# Patient Record
Sex: FEMALE | Race: WHITE
[De-identification: names, ages, dates, MRNs, and addresses within clinical notes are randomized per-mention and may not be internally consistent; named-entity substitution may affect disease eponyms.]

---

## 2017-08-30 ENCOUNTER — HOSPITAL ENCOUNTER (INPATIENT)
Dept: HOSPITAL 45 - C.EDB | Age: 57
LOS: 1 days | Discharge: HOME | DRG: 694 | End: 2017-08-31
Attending: INTERNAL MEDICINE | Admitting: HOSPITALIST
Payer: COMMERCIAL

## 2017-08-30 VITALS
WEIGHT: 216.49 LBS | BODY MASS INDEX: 40.87 KG/M2 | HEIGHT: 61 IN | BODY MASS INDEX: 40.87 KG/M2 | WEIGHT: 216.49 LBS | HEIGHT: 61 IN

## 2017-08-30 VITALS
DIASTOLIC BLOOD PRESSURE: 81 MMHG | OXYGEN SATURATION: 91 % | HEART RATE: 81 BPM | SYSTOLIC BLOOD PRESSURE: 158 MMHG | TEMPERATURE: 98.06 F

## 2017-08-30 DIAGNOSIS — E66.9: ICD-10-CM

## 2017-08-30 DIAGNOSIS — F32.9: ICD-10-CM

## 2017-08-30 DIAGNOSIS — Z79.899: ICD-10-CM

## 2017-08-30 DIAGNOSIS — Z98.84: ICD-10-CM

## 2017-08-30 DIAGNOSIS — Z90.5: ICD-10-CM

## 2017-08-30 DIAGNOSIS — Z83.3: ICD-10-CM

## 2017-08-30 DIAGNOSIS — N13.2: Primary | ICD-10-CM

## 2017-08-30 DIAGNOSIS — Z87.442: ICD-10-CM

## 2017-08-30 DIAGNOSIS — I10: ICD-10-CM

## 2017-08-30 DIAGNOSIS — E53.8: ICD-10-CM

## 2017-08-30 DIAGNOSIS — Z85.528: ICD-10-CM

## 2017-08-30 DIAGNOSIS — Z82.49: ICD-10-CM

## 2017-08-30 LAB
ANION GAP SERPL CALC-SCNC: 6 MMOL/L (ref 3–11)
APPEARANCE UR: (no result)
BILIRUB UR-MCNC: (no result) MG/DL
BUN SERPL-MCNC: 21 MG/DL (ref 7–18)
BUN/CREAT SERPL: 19 (ref 10–20)
CALCIUM SERPL-MCNC: 9.7 MG/DL (ref 8.5–10.1)
CHLORIDE SERPL-SCNC: 108 MMOL/L (ref 98–107)
CO2 SERPL-SCNC: 28 MMOL/L (ref 21–32)
COLOR UR: YELLOW
CREAT CL PREDICTED SERPL C-G-VRATE: 63.4 ML/MIN
CREAT SERPL-MCNC: 1.1 MG/DL (ref 0.6–1.2)
EOSINOPHIL NFR BLD AUTO: 334 K/UL (ref 130–400)
GLUCOSE SERPL-MCNC: 117 MG/DL (ref 70–99)
HCT VFR BLD CALC: 37.6 % (ref 37–47)
MANUAL MICROSCOPIC REQUIRED?: NO
MCH RBC QN AUTO: 26.5 PG (ref 25–34)
MCHC RBC AUTO-ENTMCNC: 31.1 G/DL (ref 32–36)
MCV RBC AUTO: 85.1 FL (ref 80–100)
NITRITE UR QL STRIP: (no result)
PH UR STRIP: 5 [PH] (ref 4.5–7.5)
PMV BLD AUTO: 9.2 FL (ref 7.4–10.4)
POTASSIUM SERPL-SCNC: 4.1 MMOL/L (ref 3.5–5.1)
RBC # BLD AUTO: 4.42 M/UL (ref 4.2–5.4)
REVIEW REQ?: NO
SODIUM SERPL-SCNC: 142 MMOL/L (ref 136–145)
SP GR UR STRIP: 1.02 (ref 1–1.03)
URINE BILL WITH OR WITHOUT MIC: (no result)
URINE EPITHELIAL CELL AUTO: >30 /LPF (ref 0–5)
UROBILINOGEN UR-MCNC: (no result) MG/DL
WBC # BLD AUTO: 11.52 K/UL (ref 4.8–10.8)
ZZUR CULT IF INDIC CLEAN CATCH: NO

## 2017-08-30 RX ADMIN — MORPHINE SULFATE PRN MG: 4 INJECTION, SOLUTION INTRAMUSCULAR; INTRAVENOUS at 12:44

## 2017-08-30 RX ADMIN — HYDROMORPHONE HYDROCHLORIDE PRN MG: 1 INJECTION, SOLUTION INTRAMUSCULAR; INTRAVENOUS; SUBCUTANEOUS at 16:44

## 2017-08-30 RX ADMIN — METOPROLOL TARTRATE SCH MG: 25 TABLET, FILM COATED ORAL at 21:08

## 2017-08-30 RX ADMIN — MORPHINE SULFATE PRN MG: 4 INJECTION, SOLUTION INTRAMUSCULAR; INTRAVENOUS at 13:26

## 2017-08-30 RX ADMIN — SODIUM CHLORIDE AND POTASSIUM CHLORIDE SCH MLS/HR: 9; 1.49 INJECTION, SOLUTION INTRAVENOUS at 16:01

## 2017-08-30 RX ADMIN — ACETAMINOPHEN PRN MG: 325 TABLET ORAL at 21:08

## 2017-08-30 RX ADMIN — HYDROMORPHONE HYDROCHLORIDE PRN MG: 1 INJECTION, SOLUTION INTRAMUSCULAR; INTRAVENOUS; SUBCUTANEOUS at 19:26

## 2017-08-30 RX ADMIN — FAMOTIDINE SCH MLS/HR: 10 INJECTION INTRAVENOUS at 17:41

## 2017-08-30 RX ADMIN — HYDROMORPHONE HYDROCHLORIDE PRN MG: 1 INJECTION, SOLUTION INTRAMUSCULAR; INTRAVENOUS; SUBCUTANEOUS at 22:15

## 2017-08-30 NOTE — EMERGENCY ROOM VISIT NOTE
History


Report prepared by Caty:  Matt Baig


Under the Supervision of:  Dr. Raymond Grissom M.D.


First contact with patient:  12:27


Chief Complaint:  FLANK PAIN


Stated Complaint:  RIGHT FLANK PAIN





History of Present Illness


The patient is a 57 year old female with a history of kidney stones and kidney 

cancer with a left kidney removal who presents to the Emergency Room with 

complaints of persistent right flank pain that started around 4 hours ago. She 

says that the pain has been a 10 out of 10 in severity, and has never been this 

bad before. The patient states that she has been nauseated as well. She says 

that she follows with Dr. Alicia of urology, and sees him every 6 months. 

The patient notes that she is due to see Dr. Alicia soon, and she usually 

has to have a stent put in when she see is passing a stone. The patient denies 

any vomiting, fevers, chills, or urinary symptoms.





   Source of History:  patient


   Onset:  4 hours ago


   Position:  other (right flank)


   Symptom Intensity:  10/10 pain - worst pain ever


   Timing:  other (persistent)


   Associated Symptoms:  + nausea, No fevers, No chills, No vomiting, No 

urinary symptoms





Review of Systems


See HPI for pertinent positives & negatives. A total of 10 systems reviewed and 

were otherwise negative.





Past Medical & Surgical


Medical Problems:


(1) Cancer of kidney


(2) History of nephrectomy, unilateral


(3) Kidney stone


(4) Migraine aura, persistent, intractable


(5) Right ureteral calculus


(6) Sepsis due to urinary tract infection


(7) Ureteral calculi


(8) UTI (urinary tract infection)








Family History





Cancer


Diabetes mellitus


FH: heart disease


FHx: gallbladder disease


Hypertension





Social History


Smoking Status:  Never Smoker


Drug Use:  none


Marital Status:  


Housing Status:  lives with significant other


Occupation Status:  employed





Current/Historical Medications


Scheduled


Lisinopril (Lisinopril), 10 MG PO QAM


Metoprolol Tartrate (Lopressor), 12.5 MG PO QAM


Venlafaxine Hcl (Effexor Xr), 75 MG PO QAM


[Vitamin B-12 Inj], INJ MONTHLY





Scheduled PRN


Ibuprofen (Motrin), 600 MG PO DAILY PRN for Pain or Fever





Allergies


Coded Allergies:  


     No Known Allergies (Verified , 8/30/17)





Physical Exam


Vital Signs











  Date Time  Temp Pulse Resp B/P (MAP) Pulse Ox O2 Delivery O2 Flow Rate FiO2


 


8/30/17 13:29  71 20 176/100 95 Room Air  


 


8/30/17 12:09 36.7 68 20 185/101 97 Room Air  











Physical Exam


GENERAL: Patient is in no acute distress.


HEENT: No acute trauma, normocephalic atraumatic, mucous membranes moist, no 

nasal congestion, no scleral icterus.


NECK: No stridor, no adenopathy, no meningismus, trachea is midline.


LUNGS: Clear to auscultation bilaterally, no wheeze, no rhonchi, breath sounds 

equal.


HEART: Without murmurs gallops or rubs, regular rate and rhythm.


ABDOMEN: Tender along entire right side. Soft, bowel sounds positive, no hernias

, no peritonitis.


BACK: Right flank discomfort with percussion.


EXTREMITIES: No cyanosis or edema, full range of motion of all the joints 

without pain or difficulty, no signs for acute trauma.


NEUROLOGIC: Oriented x 3, no acute motor or sensory deficits, no focal weakness.


SKIN: No rash, no jaundice, no diaphoresis.





Medical Decision & Procedures


ER Provider


Diagnostic Interpretation:


CT results as stated below per my review and radiologist interpretation: 








CT OF THE ABDOMEN AND PELVIS WITHOUT CONTRAST, STONE PROTOCOL





CLINICAL HISTORY: Right flank pain and hematuria.    





COMPARISON STUDY:  KUB June 28, 2016 and CT of the abdomen and pelvis September 30, 2010.





TECHNIQUE: Helical axial images of the abdomen and pelvis were obtained without


IV or oral contrast according to renal stone protocol.  A dose lowering


technique was utilized adhering to the principles of ALARA.





FINDINGS: The left kidney is not visualized and likely is surgically absent.


There is moderate right hydroureteronephrosis. Note is made of a 3 mm mid right


ureteral calculus shown on image 330 of 516. There are 2 additional distal right


ureteral calculi that each measure 6 mm. Multiple right renal calculi measure up


to 6 mm. There is mild right perinephric and periureteral ureteral infiltration.


Evaluation the remainder of the abdomen and pelvis is suboptimal on this


unenhanced exam. No abnormalities are noted within the nephrectomy bed. There is


fatty infiltration of the liver. Mild biliary ductal dilatation is unchanged and


likely related to prior cholecystectomy. The spleen is unremarkable. There is no


bowel obstruction. This colonic diverticulosis without evidence for acute


diverticulitis.





IMPRESSION:  





1. Three right ureteral calculi, including two 6 mm distal ureteral calculi and


a 3 mm mid ureteral calculus with resultant moderate right


hydroureteronephrosis.





2. Right-sided nephrolithiasis.





3. Surgically absent left kidney. No abnormality within the nephrectomy bed.





4. Status post gastric bypass. No bowel obstruction.





Electronically signed by:  Doni Ibrahim M.D.


8/30/2017 1:15 PM





Dictated Date/Time:  8/30/2017 1:05 PM





Laboratory Results


8/30/17 12:25








8/30/17 12:25

















Test


  8/30/17


12:25 8/30/17


12:27


 


Red Blood Count


  4.42 M/uL


(4.2-5.4) 


 


 


Mean Corpuscular Volume


  85.1 fL


() 


 


 


Mean Corpuscular Hemoglobin


  26.5 pg


(25-34) 


 


 


Mean Corpuscular Hemoglobin


Concent 31.1 g/dl


(32-36) 


 


 


RDW Standard Deviation


  43.4 fL


(36.4-46.3) 


 


 


RDW Coefficient of Variation


  13.9 %


(11.5-14.5) 


 


 


Mean Platelet Volume


  9.2 fL


(7.4-10.4) 


 


 


Anion Gap


  6.0 mmol/L


(3-11) 


 


 


Est Creatinine Clear Calc


Drug Dose 63.4 ml/min 


  


 


 


Estimated GFR (


American) 65.5 


  


 


 


Estimated GFR (Non-


American 56.5 


  


 


 


BUN/Creatinine Ratio 19.0 (10-20)  


 


Calcium Level


  9.7 mg/dl


(8.5-10.1) 


 


 


Urine Color  YELLOW 


 


Urine Appearance  CLOUDY (CLEAR) 


 


Urine pH  5.0 (4.5-7.5) 


 


Urine Specific Gravity


  


  1.016


(1.000-1.030)


 


Urine Protein  1+ (NEG) 


 


Urine Glucose (UA)  NEG (NEG) 


 


Urine Ketones  NEG (NEG) 


 


Urine Occult Blood  3+ (NEG) 


 


Urine Nitrite  NEG (NEG) 


 


Urine Bilirubin  NEG (NEG) 


 


Urine Urobilinogen  NEG (NEG) 


 


Urine Leukocyte Esterase  TRACE (NEG) 


 


Urine WBC (Auto)  1-5 /hpf (0-5) 


 


Urine RBC (Auto)  >30 /hpf (0-4) 


 


Urine Hyaline Casts (Auto)  1-5 /lpf (0-5) 


 


Urine Epithelial Cells (Auto)  >30 /lpf (0-5) 


 


Urine Bacteria (Auto)  NEG (NEG) 





Laboratory results reviewed by me.





Medications Administered











 Medications


  (Trade)  Dose


 Ordered  Sig/Lizzette


 Route  Start Time


 Stop Time Status Last Admin


Dose Admin


 


 Sodium Chloride  500 ml @ 


 999 mls/hr  Q31M STAT


 IV  8/30/17 12:32


 8/30/17 13:02 DC 8/30/17 12:32


999 MLS/HR


 


 Ondansetron HCl


  (Zofran Inj)  4 mg  NOW  STAT


 IV  8/30/17 12:32


 8/30/17 12:35 DC 8/30/17 12:42


4 MG


 


 Sodium Chloride  1,000 ml @ 


 200 mls/hr  Q5H STAT


 IV  8/30/17 12:32


 8/30/17 17:31  8/30/17 13:28


200 MLS/HR


 


 Morphine Sulfate


  (MoRPHine


 SULFATE INJ)  4 mg  Q15M  PRN


 IV  8/30/17 12:45


 9/13/17 12:44  8/30/17 13:26


4 MG


 


 Ketorolac


 Tromethamine


  (Toradol Inj)  15 mg  NOW  STAT


 IV  8/30/17 12:32


 8/30/17 12:35 DC 8/30/17 12:43


15 MG











ED Course


1230: The patient was evaluated in room C9. A complete history and physical 

exam was performed.





1232: Ordered Toradol Inj 15 mg IV, NSS 1000 ml @ 200 mls/hr IV, Zofran Inj 4 

mg IV,  ml @ 999 mls/hr IV.





1245: Ordered Morphine Sulfate Inj 4 mg IV PRN.





1328: I discussed the patient with Lula Lemus PA-C for Dr. Alicia (

[facility] urology) - she will talk to the attending (Dr. Phillips) about stenting the 

patient today or tomorrow. Regardless, the patient will be brought into the 

hospital, and she told me to call the hospitalist.





1332: Upon reexamination the patient is resting. I discussed results and 

treatment plan with the patient. She verbalizes agreement and understanding. 

The patient will be evaluated for further management.





1345: I discussed the patient with Dr. Strickland - [facility] hospitalist - he 

will evaluate the patient for further treatment.





Medical Decision


Differential diagnosis includes but is not limited to renal colic, UTI, renal 

failure, hydronephrosis, musculoskeletal pain, dehydration.





There is a mild leukocytosis, this could be consistent with infection or just 

her pain.  No anemia.  No significant electrolyte abnormality or kidney 

failure.  Urinalysis does not show infection but hematuria was noted.  

Abdominal and pelvis CT shows 3 fairly large stones in the right ureter causing 

hydronephrosis.





The patient received IV saline, IV Toradol, IV Zofran and IV morphine, she 

still has pain but the pain is improved.





I spoke to urology.  Admission/observation was recommended--she only has one 

kidney.  That kidney right now is obstructed.  She will likely require a 

ureteral stent.  I spoke to case management and the patient.  The on-call 

hospitalist was consulted.





Medication Reconcilliation


Current Medication List:  was personally reviewed by me





Blood Pressure Screening


Patient's blood pressure:  Elevated blood pressure


Blood pressure disposition:  Elevated BP felt to be situational





Consults


Time Called:  1325


Consulting Physician:  Lula Lemus PA-C for Dr. Alicia ([facility] urology)


Returned Call:  1328


I discussed the patient with Lula Lemus PA-C for Dr. Alicia ([facility] 

urology) - she will talk to the attending (Dr. Phillips) about stenting the 

patient today or tomorrow. Regardless, the patient will be brought into the 

hospital, and she told me to call the hospitalist.


Additional Consults:  


   Time Called:  1340


   Cons[facility]ed Physician[facility]  [facility]


   Returned Call:  1345 (in person)


Additional Comments:


I discussed the patient with Dr. Marco A TREVIÑO hospitalist - he will 

evaluate the patient for further treatment.





Impression





 Primary Impression:  


 Renal colic


 Additional Impression:  


 Hydronephrosis





Scribe Attestation


The scribe's documentation has been prepared under my direction and personally 

reviewed by me in its entirety. I confirm that the note above accurately 

reflects all work, treatment, procedures, and medical decision making performed 

by me.





Departure Information


Dispostion


Being Evaluated By Hospitalist





Referrals


No Doctor, Assigned (PCP)





Patient Instructions


My [facility]





Problem Qualifiers

## 2017-08-30 NOTE — HISTORY AND PHYSICAL
History & Physical


Date & Time of Service:


Aug 30, 2017 at 14:42


Chief Complaint:


Right Flank Pain


Primary Care Physician:


Amadou Garcia M.D.


History of Present Illness


Source:  patient, spouse, hospital records


The patient is a 57-year-old female who is status post left nephrectomy due to 

renal cell cancer, who presents emergency department with persistent right 

flank pain that began about 4 hours prior to arrival.  She has a known history 

of kidney stones, and reports the pain is similar to previous.  She's also had 

some nausea without vomiting.  She follows with , whom she sees 

approximately every 6 months.  She has had a history of ureteral stents in the 

past.





Past Medical/Surgical History


Medical Problems:


(1) Cancer of kidney


Status: Chronic  





(2) History of nephrectomy, unilateral


Status: Resolved  





(3) Kidney stone


Status: Chronic  











Family History





Cancer


Diabetes mellitus


FH: heart disease


FHx: gallbladder disease


Hypertension





Social History


Smoking Status:  Never Smoker


Smokeless Tobacco Use:  No


Alcohol Use:  none


Drug Use:  none


Marital Status:  


Housing status:  lives with family


Occupational Status:  employed





Immunizations


History of Influenza Vaccine:  No


History of Tetanus Vaccine?:  Yes


History of Pneumococcal:  No


History of Hepatitis B Vaccine:  Yes





Multi-Drug Resistant Organisms


History of MDRO:  No





Allergies


Coded Allergies:  


     No Known Allergies (Verified , 8/30/17)





Home Medications


Scheduled


Lisinopril (Lisinopril), 10 MG PO QAM


Metoprolol Tartrate (Lopressor), 12.5 MG PO QAM


Venlafaxine Hcl (Effexor Xr), 75 MG PO QAM


[Vitamin B-12 Inj], INJ MONTHLY





Scheduled PRN


Ibuprofen (Motrin), 600 MG PO DAILY PRN for Pain or Fever





Review of Systems


The patient denies chest pain, palpitations, shortness of breath, cough, lower 

extremity swelling, vision change, hearing change, sore throat, fevers, chills, 

sweats, weight change, fatigue, vomiting, diarrhea or constipation, blood in 

urine or stool, dysuria, urinary frequency or urgency, lightheadedness, 

dizziness, headache, memory loss, rash, abnormal bruising or bleeding, imbalance

, focal or generalized weakness, numbness or tingling in arms or legs, 

generalized arthralgias or myalgias, neck pain, night sweats, or allergy 

symptoms.


The review of systems is otherwise negative other than for that already noted 

above, and at least 10 systems have been reviewed.





Physical Exam


Vital Signs











  Date Time  Temp Pulse Resp B/P (MAP) Pulse Ox O2 Delivery O2 Flow Rate FiO2


 


8/30/17 13:29  71 20 176/100 95 Room Air  


 


8/30/17 12:09 36.7 68 20 185/101 97 Room Air  








The patient is awake, well-developed and adequately nourished, alert and 

oriented 3, normocephalic and atraumatic, lying in bed and in no acute 

distress.


HEENT--PERRL, EOMI, mucous membranes  and oropharynx dry.


Neck--supple, no JVD or bruits, thyroid normal, trachea midline, no adenopathy.


Heart--normal S1 and S2, no extra beats, no murmurs, rubs or gallops.


Lungs--clear bilaterally with good air movement, no respiratory distress, no 

accessory muscle use.


Abdomen--normal bowel sounds and soft, nontender and nondistended, no hernias 

or masses,  no organomegaly.  Right flank pain upon palpation.


Extremities--no cyanosis, clubbing or edema. There are good distal pulses b/l.


Dermatologic--normal skin turgor, normal color, warm and dry, no abnormal lymph 

nodes, no rash.


Neurologic--cranial nerves II through XII grossly intact, motor and sensory 

examination normal.


Rheumatologic--normal range of motion, nontender, muscles and joints.


Psychiatric--normal affect.





Diagnostics


Laboratory Results





Results Past 24 Hours








Test


  8/30/17


12:25 8/30/17


12:27 Range/Units


 


 


White Blood Count 11.52  4.8-10.8  K/uL


 


Red Blood Count 4.42  4.2-5.4  M/uL


 


Hemoglobin 11.7  12.0-16.0  g/dL


 


Hematocrit 37.6  37-47  %


 


Mean Corpuscular Volume 85.1    fL


 


Mean Corpuscular Hemoglobin 26.5  25-34  pg


 


Mean Corpuscular Hemoglobin


Concent 31.1


  


  32-36  g/dl


 


 


RDW Standard Deviation 43.4  36.4-46.3  fL


 


RDW Coefficient of Variation 13.9  11.5-14.5  %


 


Platelet Count 334  130-400  K/uL


 


Mean Platelet Volume 9.2  7.4-10.4  fL


 


Sodium Level 142  136-145  mmol/L


 


Potassium Level 4.1  3.5-5.1  mmol/L


 


Chloride Level 108    mmol/L


 


Carbon Dioxide Level 28  21-32  mmol/L


 


Anion Gap 6.0  3-11  mmol/L


 


Blood Urea Nitrogen 21  7-18  mg/dl


 


Creatinine


  1.10


  


  0.60-1.20


mg/dl


 


Est Creatinine Clear Calc


Drug Dose 63.4


  


   ml/min


 


 


Estimated GFR (


American) 65.5


  


   


 


 


Estimated GFR (Non-


American 56.5


  


   


 


 


BUN/Creatinine Ratio 19.0  10-20  


 


Random Glucose 117  70-99  mg/dl


 


Calcium Level 9.7  8.5-10.1  mg/dl


 


Urine Color  YELLOW  


 


Urine Appearance  CLOUDY CLEAR  


 


Urine pH  5.0 4.5-7.5  


 


Urine Specific Gravity  1.016 1.000-1.030  


 


Urine Protein  1+ NEG  


 


Urine Glucose (UA)  NEG NEG  


 


Urine Ketones  NEG NEG  


 


Urine Occult Blood  3+ NEG  


 


Urine Nitrite  NEG NEG  


 


Urine Bilirubin  NEG NEG  


 


Urine Urobilinogen  NEG NEG  


 


Urine Leukocyte Esterase  TRACE NEG  


 


Urine WBC (Auto)  1-5 0-5  /hpf


 


Urine RBC (Auto)  >30 0-4  /hpf


 


Urine Hyaline Casts (Auto)  1-5 0-5  /lpf


 


Urine Epithelial Cells (Auto)  >30 0-5  /lpf


 


Urine Bacteria (Auto)  NEG NEG  








Microbiology Results


8/30/17 Urine Culture, Received


          Pending





Diagnostic Radiology





                                                                               

                                                                 


Patient Name: SEGUNDO LORD                               Unit Number:  

[id number]                  


 








 











Dictated: 08/30/17 1305


 


Transcribed: 08/30/17 1305


 





 


Printed Date/Time: [~ rep prt dt]/[~ rep prt tm]








 [~ rep ct labl] - [~ rep ct ivnm]


 





   [facility]


 Radiology Department


 [place] [address] [phone]





 











Dictated: 08/30/17 1305


 


Transcribed: 08/30/17 1305


 





 


Printed Date/Time: [~ rep prt dt]/[~ rep prt tm]








 [~ rep ct labl] - [~ rep ct ivnm]


 








[~ rep ct add3]]


CT OF THE ABDOMEN AND PELVIS WITHOUT CONTRAST, STONE PROTOCOL





CLINICAL HISTORY: Right flank pain and hematuria.    





COMPARISON STUDY:  KUB June 28, 2016 and CT of the abdomen and pelvis September 30, 2010.





TECHNIQUE: Helical axial images of the abdomen and pelvis were obtained without


IV or oral contrast according to renal stone protocol.  A dose lowering


technique was utilized adhering to the principles of ALARA.





FINDINGS: The left kidney is not visualized and likely is surgically absent.


There is moderate right hydroureteronephrosis. Note is made of a 3 mm mid right


ureteral calculus shown on image 330 of 516. There are 2 additional distal right


ureteral calculi that each measure 6 mm. Multiple right renal calculi measure up


to 6 mm. There is mild right perinephric and periureteral ureteral infiltration.


Evaluation the remainder of the abdomen and pelvis is suboptimal on this


unenhanced exam. No abnormalities are noted within the nephrectomy bed. There is


fatty infiltration of the liver. Mild biliary ductal dilatation is unchanged and


likely related to prior cholecystectomy. The spleen is unremarkable. There is no


bowel obstruction. This colonic diverticulosis without evidence for acute


diverticulitis.





IMPRESSION:  





1. Three right ureteral calculi, including two 6 mm distal ureteral calculi and


a 3 mm mid ureteral calculus with resultant moderate right


hydroureteronephrosis.





2. Right-sided nephrolithiasis.





3. Surgically absent left kidney. No abnormality within the nephrectomy bed.





4. Status post gastric bypass. No bowel obstruction.











Electronically signed by:  Doni Ibrahim M.D.


8/30/2017 1:15 PM





Dictated Date/Time:  8/30/2017 1:05 PM





 The status of this report is Signed.   


 Draft = Not yet reviewed or approved by Radiologist.  


 Signed = Reviewed and approved by Radiologist.


<AttendingPhy></AttendingPhy> <FamilyPhy></FamilyPhy> <PrimaryPhy>No Doctor, 

Assigned</PrimaryPhy> <UnitNumber>[id number]</UnitNumber> <VisitNumber>

[id number]</VisitNumber> <PatientName>SEGUNDO LORD</PatientName> <DateOfBirth>

1960</DateOfBirth> <Location>[facility]</Location> <ServiceDate>08/30/17</

ServiceDate> <MNE>ESINDI</MNE> <OrderingPhy>Raymond Grissom M.D.</OrderingPhy> <

OrderingPhyMNE>f rep ord dr murguia</OrderingPhyMNE> <DictatingPhyMNE>f rep dict dr 

mne</DictatingPhyMNE> <CCListMNE>f rep ct mne</CCListMNE> <AdmittingPhyMNE>f pt 

admit dr murguai</AdmittingPhyMNE> <AttendingPhyMNE>f pt attend dr murguia</

AttendingPhyMNE>


<ConsultingPhyMNE>f pt consult dr murguia</ConsultingPhyMNE> <FamilyPhyMNE>f pt fam 

dr murguia</FamilyPhyMNE> <OtherPhyMNE>f pt other dr murguia</OtherPhyMNE> <

PrimaryPhyMNE>f pt prim care dr murguia</PrimaryPhyMNE> <ReferringPhyMNE>f pt 

referring dr murguia</ReferringPhyMNE>





                                                                               

                                                                 


Patient Name: SEGUNDO LORD                               Unit Number:  

[id number]                  


 








 











Dictated: 08/30/17 1437


 


Transcribed: 08/30/17 1437


 


JRB


 


Printed Date/Time: [~ rep prt dt]/[~ rep prt tm]








 [~ rep ct labl] - [~ rep ct ivnm]


 





   [facility]


 Radiology Department


 [place] [address]


 [phone]





 











Dictated: 08/30/17 1437


 


Transcribed: 08/30/17 1437


 


JRB


 


Printed Date/Time: [~ rep prt dt]/[~ rep prt tm]








 [~ rep ct labl] - [~ rep ct ivnm]


 








[~ rep ct add3]]


CHEST 2 VIEWS ROUTINE





HISTORY:  57 years-old Female preoperative exam. No acute chest complaints.





COMPARISON: Portable chest radiograph 5/18/2016





TECHNIQUE: Frontal and lateral views of the chest.





FINDINGS: 


Cardiomediastinal and hilar silhouettes are within normal limits. Mild elevation


of the right hemidiaphragm persists without pneumothorax, pleural effusion or


focal airspace consolidation. No overt pulmonary edema.





Surgical clips are noted within the upper abdomen.





IMPRESSION: Unchanged mild right hemidiaphragmatic elevation without acute


cardiopulmonary process. 











The above report was generated using voice recognition software. It may contain


grammatical, syntax or spelling errors.











Electronically signed by:  Renny Welch M.D.


8/30/2017 2:38 PM





Dictated Date/Time:  8/30/2017 2:37 PM





 The status of this report is Signed.   


 Draft = Not yet reviewed or approved by Radiologist.  


 Signed = Reviewed and approved by Radiologist.


<AttendingPhy></AttendingPhy> <FamilyPhy>Amadou Garcia M.D.</FamilyPhy> <

PrimaryPhy>Amadou Garcia M.D.</PrimaryPhy> <UnitNumber>[id number]</UnitNumber

> <VisitNumber>[id number]</VisitNumber> <PatientName>SEGUNDO LORD</PatientName

> <DateOfBirth>1960</DateOfBirth> <Location>[facility]</Location> <ServiceDate>

08/30/17</ServiceDate> <MNE>ESINDI</MNE> <OrderingPhy>Lula Nuno</

OrderingPhy> <OrderingPhyMNE>f rep ord dr murguia</OrderingPhyMNE> <DictatingPhyMNE>

f rep dict dr murguia</DictatingPhyMNE> <CCListMNE>f rep ct blade</CCListMNE> <

AdmittingPhyMNE>f pt admit dr murguia</AdmittingPhyMNE> <AttendingPhyMNE>f pt 

attend dr murguia</AttendingPhyMNE>


<ConsultingPhyMNE>f pt consult dr murguia</ConsultingPhyMNE> <FamilyPhyMNE>f pt fam 

dr murguia</FamilyPhyMNE> <OtherPhyMNE>f pt other dr murguia</OtherPhyMNE> <

PrimaryPhyMNE>f pt prim care dr murguia</PrimaryPhyMNE> <ReferringPhyMNE>f pt 

referring dr murguia</ReferringPhyMNE>





Impression


Assessment and Plan


Right ureteral calculi 3 with moderate right hydronephrosis/status post left 

nephrectomy for renal cell cancer--the patient be admitted to medical surgical 

floor.


Nothing by mouth except medications.


NSS with KCl 20 mEq at 100 mils per hour.


Ceftriaxone 1 g IV daily.


Follow urine culture and sensitivity.


Dilaudid 0.5-1 mg IV every 2 hours when necessary severe pain.


Consult her urologist Dr. Alicia.


Patient should avoid NSAIDs both inpatient and outpatient, in particular in 

combination with ACE inhibitors.





Hypertension--continue metoprolol tartrate the change from 12.5 mg every 

morning to twice a day.


Follow-up lisinopril 10 mg by mouth every morning.





Depression--continue Effexor XR 75 mg by mouth every morning





Vitamin B12 deficiency-- gets monthly injections.





Level of Care


Med/Surg





Advanced Directives


Existing Advance Directive:  No


Existing Living Will:  No


Existing Power of :  No





Resuscitation Status


FULL RESUSCITATION





VTE Prophylaxis


VTE Risk Assessment Done? Y/N:  Yes


Risk Level:  Moderate


Given or contraindicated:  SCD's





Social Service Consult


None Apply

## 2017-08-30 NOTE — DIAGNOSTIC IMAGING REPORT
CT OF THE ABDOMEN AND PELVIS WITHOUT CONTRAST, STONE PROTOCOL



CLINICAL HISTORY: Right flank pain and hematuria.    



COMPARISON STUDY:  KUB June 28, 2016 and CT of the abdomen and pelvis September 30, 2010.



TECHNIQUE: Helical axial images of the abdomen and pelvis were obtained without

IV or oral contrast according to renal stone protocol.  A dose lowering

technique was utilized adhering to the principles of ALARA.



FINDINGS: The left kidney is not visualized and likely is surgically absent.

There is moderate right hydroureteronephrosis. Note is made of a 3 mm mid right

ureteral calculus shown on image 330 of 516. There are 2 additional distal right

ureteral calculi that each measure 6 mm. Multiple right renal calculi measure up

to 6 mm. There is mild right perinephric and periureteral ureteral infiltration.

Evaluation the remainder of the abdomen and pelvis is suboptimal on this

unenhanced exam. No abnormalities are noted within the nephrectomy bed. There is

fatty infiltration of the liver. Mild biliary ductal dilatation is unchanged and

likely related to prior cholecystectomy. The spleen is unremarkable. There is no

bowel obstruction. This colonic diverticulosis without evidence for acute

diverticulitis.



IMPRESSION:  



1. Three right ureteral calculi, including two 6 mm distal ureteral calculi and

a 3 mm mid ureteral calculus with resultant moderate right

hydroureteronephrosis.



2. Right-sided nephrolithiasis.



3. Surgically absent left kidney. No abnormality within the nephrectomy bed.



4. Status post gastric bypass. No bowel obstruction.







Electronically signed by:  Doni Ibrahim M.D.

8/30/2017 1:15 PM



Dictated Date/Time:  8/30/2017 1:05 PM

## 2017-08-30 NOTE — DIAGNOSTIC IMAGING REPORT
CHEST 2 VIEWS ROUTINE



HISTORY:  57 years-old Female preoperative exam. No acute chest complaints.



COMPARISON: Portable chest radiograph 5/18/2016



TECHNIQUE: Frontal and lateral views of the chest.



FINDINGS: 

Cardiomediastinal and hilar silhouettes are within normal limits. Mild elevation

of the right hemidiaphragm persists without pneumothorax, pleural effusion or

focal airspace consolidation. No overt pulmonary edema.



Surgical clips are noted within the upper abdomen.



IMPRESSION: Unchanged mild right hemidiaphragmatic elevation without acute

cardiopulmonary process. 







The above report was generated using voice recognition software. It may contain

grammatical, syntax or spelling errors.







Electronically signed by:  Renny Welch M.D.

8/30/2017 2:38 PM



Dictated Date/Time:  8/30/2017 2:37 PM

## 2017-08-31 VITALS
TEMPERATURE: 98.24 F | DIASTOLIC BLOOD PRESSURE: 85 MMHG | HEART RATE: 73 BPM | SYSTOLIC BLOOD PRESSURE: 143 MMHG | OXYGEN SATURATION: 93 %

## 2017-08-31 VITALS
SYSTOLIC BLOOD PRESSURE: 119 MMHG | HEART RATE: 57 BPM | DIASTOLIC BLOOD PRESSURE: 70 MMHG | OXYGEN SATURATION: 96 % | TEMPERATURE: 97.88 F

## 2017-08-31 VITALS
TEMPERATURE: 98.6 F | OXYGEN SATURATION: 94 % | HEART RATE: 74 BPM | SYSTOLIC BLOOD PRESSURE: 136 MMHG | DIASTOLIC BLOOD PRESSURE: 83 MMHG

## 2017-08-31 VITALS
TEMPERATURE: 98.6 F | DIASTOLIC BLOOD PRESSURE: 83 MMHG | OXYGEN SATURATION: 94 % | SYSTOLIC BLOOD PRESSURE: 136 MMHG | HEART RATE: 74 BPM

## 2017-08-31 VITALS — OXYGEN SATURATION: 94 %

## 2017-08-31 LAB
ANION GAP SERPL CALC-SCNC: 7 MMOL/L (ref 3–11)
BASOPHILS # BLD: 0.02 K/UL (ref 0–0.2)
BASOPHILS NFR BLD: 0.3 %
BUN SERPL-MCNC: 18 MG/DL (ref 7–18)
BUN/CREAT SERPL: 18 (ref 10–20)
CALCIUM SERPL-MCNC: 8.9 MG/DL (ref 8.5–10.1)
CHLORIDE SERPL-SCNC: 112 MMOL/L (ref 98–107)
CO2 SERPL-SCNC: 26 MMOL/L (ref 21–32)
COMPLETE: YES
CREAT CL PREDICTED SERPL C-G-VRATE: 68.9 ML/MIN
CREAT SERPL-MCNC: 0.99 MG/DL (ref 0.6–1.2)
EOSINOPHIL NFR BLD AUTO: 257 K/UL (ref 130–400)
GLUCOSE SERPL-MCNC: 87 MG/DL (ref 70–99)
HCT VFR BLD CALC: 33.5 % (ref 37–47)
IG%: 0.3 %
IMM GRANULOCYTES NFR BLD AUTO: 31.2 %
LYMPHOCYTES # BLD: 2.04 K/UL (ref 1.2–3.4)
MAGNESIUM SERPL-MCNC: 2.3 MG/DL (ref 1.8–2.4)
MCH RBC QN AUTO: 27.2 PG (ref 25–34)
MCHC RBC AUTO-ENTMCNC: 31.6 G/DL (ref 32–36)
MCV RBC AUTO: 85.9 FL (ref 80–100)
MONOCYTES NFR BLD: 6.7 %
NEUTROPHILS # BLD AUTO: 2.1 %
NEUTROPHILS NFR BLD AUTO: 59.4 %
PMV BLD AUTO: 9 FL (ref 7.4–10.4)
POTASSIUM SERPL-SCNC: 4.2 MMOL/L (ref 3.5–5.1)
RBC # BLD AUTO: 3.9 M/UL (ref 4.2–5.4)
SODIUM SERPL-SCNC: 145 MMOL/L (ref 136–145)
WBC # BLD AUTO: 6.54 K/UL (ref 4.8–10.8)

## 2017-08-31 PROCEDURE — 0T768DZ DILATION OF RIGHT URETER WITH INTRALUMINAL DEVICE, VIA NATURAL OR ARTIFICIAL OPENING ENDOSCOPIC: ICD-10-PCS | Performed by: UROLOGY

## 2017-08-31 PROCEDURE — BT16ZZZ FLUOROSCOPY OF RIGHT URETER: ICD-10-PCS | Performed by: UROLOGY

## 2017-08-31 RX ADMIN — ACETAMINOPHEN PRN MG: 325 TABLET ORAL at 06:06

## 2017-08-31 RX ADMIN — FAMOTIDINE SCH MLS/HR: 10 INJECTION INTRAVENOUS at 06:03

## 2017-08-31 RX ADMIN — SODIUM CHLORIDE AND POTASSIUM CHLORIDE SCH MLS/HR: 9; 1.49 INJECTION, SOLUTION INTRAVENOUS at 01:50

## 2017-08-31 RX ADMIN — HYDROMORPHONE HYDROCHLORIDE PRN MG: 1 INJECTION, SOLUTION INTRAMUSCULAR; INTRAVENOUS; SUBCUTANEOUS at 01:50

## 2017-08-31 RX ADMIN — METOPROLOL TARTRATE SCH MG: 25 TABLET, FILM COATED ORAL at 11:16

## 2017-08-31 NOTE — DIAGNOSTIC IMAGING REPORT
KUB



HISTORY: Renal  STONES



COMPARISON: KUB 6/28/2016. Abdomen and pelvis CT 8/30/2017.



FINDINGS: The bowel gas pattern is unremarkable. There are no dilated loops of

small bowel to suggest an obstruction.  Stable round calcifications within the

left pelvis consistent with phleboliths. No pneumoperitoneum or pneumatosis.

There are 2 stones within the lower pole of the right kidney with the largest

measuring 6 mm. A 7 mm calcification within the right deep pelvis is

demonstrated to be a phlebolith on the previous study. No right ureteral calculi

identified. Surgical clips within the left upper quadrant consistent with prior

left nephrectomy and gastric bypass.



IMPRESSION:  



1. Stable right-sided nephrolithiasis.

2. No ureteral calculi.







Electronically signed by:  Blake Uribe M.D.

8/31/2017 8:51 AM



Dictated Date/Time:  8/31/2017 8:48 AM

## 2017-08-31 NOTE — [facility] OPERATIVE REPORT
Operative Report


Operative Date


Aug 31, 2017.





Pre-Operative Diagnosis





Right ureteral and renal stones, solitary kidney





Post-Operative Diagnosis





Same as preop





Procedure(s) Performed





Cystoscopy, Right Retrograde pyleogram, right Ureteral Stent insertion





Surgeon


Dr. SHIMON Phillips





Assistant Surgeon(s)


NA





Estimated Blood Loss


0 ml





Findings


Good stent position after completion of case.





Specimens





None, as per surgeon





Drains


6 fr multilength ureteral stent





Anesthesia


MAC





Complication(s)


None





Disposition


Recovery Room / PACU





Indications


Patient is a pleasant 57-year-old  female well-known to our service 

was admitted with intractable right flank pain.  On ER evaluation she was found 

to have 3 stones within the right ureter and several stones in the kidney as 

well.  A history of solitary kidney on the right-hand side due to a left renal 

cell carcinomas noted.  Creatinine has remained normal throughout her 

admission.  She has passed several stones overnight from her solitary kidney on 

KUB imaging this morning evidence of a distal stone persists.  In addition she 

continues to have right-sided nonobstructing stones.  She is being brought in 

for right ureteral stent placement due to her solitary kidney status and stone 

passage.  Risks and benefits of intervention have been discussed and the 

patient vocalizes good understanding the treatment plan.  She's been covered 

with cephalosporins as an inpatient and ciprofloxacin was provided in addition 

this morning preoperatively.  Please see urology consultation for further 

details.





Description of Procedure


Patient was properly identified and brought into the operative suite after 

identification of appropriate consent of the chart.  Mac anesthesia was 

initiated and patient was prepped and draped in standard fashion for this 

procedure.  Full timeout procedure was followed.  22 Azeri rigid cystoscope 

was placed in the bladder direct visualization the bladder was surveyed in its 

entirety demonstrating no intravesical lesions, papillary masses, mucosal 

abnormalities or calculi.  Ureteral orifices were appreciated in the normal 

anatomic location bilaterally.  On  imaging stone was not as evident as on 

KUB.  Right-sided ureteral orifice was addressed and gentle retrograde 

pyelography was performed.  This demonstrated a normal caliber ureter up to the 

level of the kidney.  A sensor tip wire was advanced up to the level of the 

right renal pelvis followed by multilength ureteral catheter with several coils 

present within the renal pelvis and a double coil present within the bladder.  

Hydronephrotic drip was appreciated.  Patient tolerated the procedure well.  

Bladder was drained and cystoscope was removed.  Anesthesia was reversed 

patient was transferred to the recovery room in stable condition.





Follow-up care: Patient should be stable for discharge home this afternoon.  

Prescriptions for Pyridium, ciprofloxacin and Percocet or placed within her 

chart.  We'll arrange for right-sided ureteroscopy as an outpatient to clear 

her kidney and ureter of stone.  Patient to contact our service with any 

postoperative difficulties.


I attest to the content of the Intraoperative Record and any orders documented 

therein.  Any exceptions are noted below.

## 2017-08-31 NOTE — DISCHARGE SUMMARY
Discharge Summary


Date of Service


Aug 31, 2017.





Discharge Summary


Admission Date:


Aug 30, 2017 at 13:57


Discharge Date:  Aug 31, 2017


Discharge Disposition:  Home


Principal Diagnosis:  obstructive uropathy


Immunizations:  


   Have You Had Influenza Vaccine:  No


   History of Tetanus Vaccine?:  Yes


   History of Pneumococcal:  No


   History of Hepatitis B Vaccine:  Yes





Medication Reconciliation


New Medications:  


Ciprofloxacin (Cipro) 250 Mg Tab


250 MG PO BID, #6 TAB





Oxycodone/Acetaminophen 5MG/325MG (Percocet 5MG/325MG)  Tab


1 TABLET PO Q4H PRN for Pain, #20 TAB





Phenazopyridine Hcl (Pyridium) 100 Mg Tab


100 MG PO Q8 PRN for Bladder pain, #20 TAB





 


Continued Medications:  


Ibuprofen (Motrin) 600 Mg Tab


600 MG PO DAILY PRN for Pain or Fever, TAB


TAKE WITH FOOD


Lisinopril (Lisinopril) 10 Mg Tab


10 MG PO QAM





Metoprolol Tartrate (Lopressor) 25 Mg Tab


12.5 MG PO QAM, TAB





Venlafaxine Hcl (Effexor Xr) 75 Mg Cap


75 MG PO QAM for 30 Days, #30 CAP 1 Refill





[Vitamin B-12 Inj] ()  


INJ MONTHLY











Discharge Exam


Review of Systems:  


   Constitutional:  No fever, No chills, No sweats, No weight loss, No weakness

, No fatigue, No problem reported


   Eyes:  No worsening of vision, No eye pain, No redness, No discharge, No 

diplopia, No problem reported


   ENT:  No hearing loss, No unusual epistaxis, No nasal symptoms, No sore 

throat, No tinnitus, No dental problems, No trouble swallowing, No problem 

reported


   Respiratory:  No cough, No sputum, No wheezing, No shortness of breath, No 

dyspnea on exertion, No dyspnea at rest, No hemoptysis, No problem reported


   Cardiovascular:  No chest pain, No orthopnea, No PND, No edema, No 

claudication, No palpitations, No problem reported


   Abdomen:  No pain, No nausea, No vomiting, No diarrhea, No constipation, No 

GI bleeding, No problem reported


   Musculoskeletal:  No joint pain, No muscle pain, No swelling, No calf pain, 

No problem reported


   Neurologic:  No memory loss, No paralysis, No weakness, No numbness/tingling

, No vertigo, No balance problems, No problem reported


   Psychiatric:  No depression symptoms, No anhedonism, No anxiety, No insomnia

, No substance abuse, No problem reported


   Endocrine:  No fatigue, No excessive thirst, No excessive urination, No 

problem reported


   Hematologic / Lymphatic:  No abnormal bleeding/bruising, No clotting problems

, No swollen lymph nodes, No night sweats, No problem reported


   Integumentary:  No rash, No itch, No new/changing skin lesions, No color 

change, No bleeding, No problem reported


Physical Exam:  


   General Appearance:  WD/WN, no apparent distress


   Eyes:  normal inspection, EOMI


   ENT:  normal ENT inspection, hearing grossly normal


   Neck:  supple


   Respiratory/Chest:  chest non-tender, lungs clear, normal breath sounds


   Cardiovascular:  regular rate, rhythm, no edema, no gallop, no JVD, no murmur


   Abdomen / GI:  normal bowel sounds, non tender, soft, no organomegaly, no 

pulsatile mass, occult blood negative


   Extremities:  normal inspection, no calf tenderness, normal capillary refill

, no pedal edema


   Neurologic/Psychiatric:  CNs II-XII nml as tested, no motor/sensory deficits

, alert, normal mood/affect, normal reflexes, oriented x 3


   Skin:  normal color, warm/dry, no rash





Hospital Course


57 years old female with past medical history of  status post left nephrectomy 

for renal cell cancer, presented to the hospital with right renal angle pain.  

Imaging showed


Right ureteral calculi 3 with moderate right hydronephrosis


She was admitted to medical surgical floor.


Started on IV fluids and ceftriaxone


Consulted her urologist Dr. Alicia.  Urine culture was sent and needs to be 

followed by primary care physician as an outpatient


She was continued and her blood pressure medication and depression medications 


seen by urologist and went Cystoscopy, Right Retrograde pyleogram, right 

Ureteral Stent insertion


Stable for discharge home and will follow up with urologist as an outpatient


Total Time Spent:  Less than 30 minutes


This includes examination of the patient, discharge planning, medication 

reconciliation, and communication with other providers.





Discharge Instructions


Please refer to the electronic Patient Visit Report (Discharge Instructions) 

for additional information.

## 2017-08-31 NOTE — DIAGNOSTIC IMAGING REPORT
RETROGRADE INCLUDES KUB



HISTORY: RIGHT STENT, POSSIBLE LASER



FLUOROSCOPY TIME:  31 seconds.



FINDINGS: 4 fluoroscopic spot images were submitted for review. Initial images

demonstrate contrast opacification the right ureter and a retrograde fashion.

This is followed by placement of a right ureteral stent. Only the proximal

portion of the stent is identified and appears to be in good position.



IMPRESSION:  Fluoroscopy provided for right ureteral stent placement.. 







Electronically signed by:  Blake Uribe M.D.

8/31/2017 10:27 AM



Dictated Date/Time:  8/31/2017 10:26 AM

## 2017-08-31 NOTE — UROLOGY CONSULTATION
History


General


Date of Service:


Aug 31, 2017.


Chief Complaint:  right flank pain


Primary Care Physician:


Amadou Garcia M.D.


Pt seen a urologist before?:  Yes (Dr. Tesfaye Alicia)


If yes, why?:  nephrolithiasis





History of Present Illness


56 yo female presents to [facility] with c/o right flank pain that started yesterday. 

The pain was accompanied by nausea.


Denies dysuria or hematuria. 


CT scan shows a she is passing 3 right ureteral stones. The largest is a 6mm 

distal right ureteral stone. 


She does have a previous hx of stones requiring ESWL and URS/LL in the past. 

She sees Dr. Alicia for this issue. 


She is mononephric d/t hx of RCC. 


Labs pending this morning. 


UC&S pending as well.





Imaging


Imaging:  CT





Laboratory





Last 24 Hours








Test


  17


12:25 17


12:27 17


04:44


 


White Blood Count 11.52 K/uL   


 


Red Blood Count 4.42 M/uL   


 


Hemoglobin 11.7 g/dL   


 


Hematocrit 37.6 %   


 


Mean Corpuscular Volume 85.1 fL   


 


Mean Corpuscular Hemoglobin 26.5 pg   


 


Mean Corpuscular Hemoglobin


Concent 31.1 g/dl 


  


  


 


 


RDW Standard Deviation 43.4 fL   


 


RDW Coefficient of Variation 13.9 %   


 


Platelet Count 334 K/uL   


 


Mean Platelet Volume 9.2 fL   


 


Sodium Level 142 mmol/L   


 


Potassium Level 4.1 mmol/L   


 


Chloride Level 108 mmol/L   


 


Carbon Dioxide Level 28 mmol/L   


 


Anion Gap 6.0 mmol/L   


 


Blood Urea Nitrogen 21 mg/dl   


 


Creatinine 1.10 mg/dl   


 


Est Creatinine Clear Calc


Drug Dose 63.4 ml/min 


  


  


 


 


Estimated GFR (


American) 65.5 


  


  


 


 


Estimated GFR (Non-


American 56.5 


  


  


 


 


BUN/Creatinine Ratio 19.0   


 


Random Glucose 117 mg/dl   


 


Calcium Level 9.7 mg/dl   


 


Urine Color  YELLOW  


 


Urine Appearance  CLOUDY  


 


Urine pH  5.0  


 


Urine Specific Gravity  1.016  


 


Urine Protein  1+  


 


Urine Glucose (UA)  NEG  


 


Urine Ketones  NEG  


 


Urine Occult Blood  3+  


 


Urine Nitrite  NEG  


 


Urine Bilirubin  NEG  


 


Urine Urobilinogen  NEG  


 


Urine Leukocyte Esterase  TRACE  


 


Urine WBC (Auto)  1-5 /hpf  


 


Urine RBC (Auto)  >30 /hpf  


 


Urine Hyaline Casts (Auto)  1-5 /lpf  


 


Urine Epithelial Cells (Auto)  >30 /lpf  


 


Urine Bacteria (Auto)  NEG  











Problem List


Medical Problems:


(1) Fever


Status: Acute  





(2) Hydronephrosis


Status: Acute  





(3) Renal colic


Status: Acute  











Past History


cancer (RCC), hypertension, kidney stones, other (Vit B12 deficiency)


Past Surgical History:  , cholecystectomy, gastric bypass, hysterectomy

, lithotripsy, nephrectomy, ureteral stent





Family History





Cancer


Diabetes mellitus


FH: heart disease


FHx: gallbladder disease


Hypertension





Social History


Hx Tobacco Use In Past Year?:  No


Smoking:  non-smoker


Alcohol:  never


Marital status:  


Housing status:  lives with family


Occupation status:  employed





Immunizations


History of Influenza Vaccine:  No


History of Tetanus Vaccine?:  Yes


History of Pneumococcal:  No


History of Hepatitis B Vaccine:  Yes





History of MDRO


No





Allergies


Coded Allergies:  


     No Known Allergies (Verified , 17)





Medications


Home Medications:





Home Meds and Scripts








 Medications  Dose


 Route/Sig


 Max Daily Dose Days Date Category Dose


Instructions


 


 Motrin


  (Ibuprofen) 600


 Mg Tab  600 Mg


 PO DAILY PRN


    17 Reported  TAKE WITH FOOD


 


 Effexor Xr


  (Venlafaxine Hcl)


 75 Mg Cap  75 Mg


 PO QAM


   30 16 Reported 


 


 Lisinopril 10 Mg


 Tab  10 Mg


 PO QAM


    16 Reported 


 


 [Vitamin B-12


 Inj]  


 INJ MONTHLY


    13 Reported 


 


 Lopressor


  (Metoprolol


 Tartrate) 25 Mg


 Tab  12.5 Mg


 PO QAM


    13 Reported 








Inpatient Medications:





Current Inpatient Medications








 Medications


  (Trade)  Dose


 Ordered  Sig/Lizzette


 Route  Start Time


 Stop Time Status Last Admin


Dose Admin


 


 Ceftriaxone


 Sodium 1 gm/


 Dextrose  50 ml @ 


 100 mls/hr  Q24H


 IV  17 16:00


 17 15:59  17 16:02


100 MLS/HR


 


 Acetaminophen


  (Tylenol Tab)  650 mg  Q4H  PRN


 PO  17 14:00


 17 13:59  17 06:06


650 MG


 


 Ondansetron HCl


  (Zofran Inj)  4 mg  Q6H  PRN


 IV  17 14:00


 17 13:59   


 


 


 Potassium


 Chloride/Sodium


 Chloride  1,000 ml @ 


 100 mls/hr  Q10H


 IV  17 16:00


 17 15:59  17 01:50


100 MLS/HR


 


 Famotidine 20 mg/


 Dextrose  102 ml @ 


 200 mls/hr  Q12H


 IV  17 18:00


 17 17:59  17 06:03


200 MLS/HR


 


 Hydromorphone HCl


  (Dilaudid Inj)  1 mg  Q2H  PRN


 IV  17 14:00


 17 13:59  17 19:26


1 MG


 


 Hydromorphone HCl


  (Dilaudid Inj)  0.5 mg  Q2H  PRN


 IV  17 14:00


 17 13:59  17 01:50


0.5 MG


 


 Lisinopril


  (Zestril Tab)  10 mg  QAM


 PO  17 08:00


 17 08:59   


 


 


 Metoprolol


 Tartrate


  (Lopressor Tab)  12.5 mg  BID


 PO  17 20:00


 17 20:59  17 21:08


12.5 MG


 


 Venlafaxine HCl


  (effeXOR


 EXTENDED REL CAP)  75 mg  QAM


 PO  17 08:00


 17 08:59   


 











Review of Systems


Review of Systems


Constitutional:  No fever, No chills


Eyes:  No double vision


Neurological:  No dizzy


Endocrine:  No excessive thirst


Gastrointestinal:  + abdominal pain (RLQ and flank ), No nausea, No vomiting


Cardiovascular:  No chest pain


Respiratory:  No shortness of breath


Skin:  No rash


Musculoskeletal:  + back pain (right low back and flank )


Female :  No painful urination, No blood in urine





Physical Exam


Vital Signs:





Vital Signs Past 12 Hours








  Date Time  Temp Pulse Resp B/P (MAP) Pulse Ox O2 Delivery O2 Flow Rate FiO2


 


17 00:27 36.6 57 18 119/70 (86) 96 Room Air  


 


17 00:00     94 Room Air  








Physical Exam:


General Appearance:  no apparent distress, + obese


Eyes:  bilateral eyes normal inspection


ENT:  hearing grossly normal


Neck:  no JVD


Respiratory/Chest:  no respiratory distress, no accessory muscle use


Cardiovascular:  no JVD


Extremities:  normal inspection


Neurologic/Psychiatric:  alert, normal mood/affect, oriented x 3


Skin:  normal color





Assessment & Plan


Assessment & Plan


Treatment Planned:  cystoscopy w/ stent





A/P: Right ureteral stone x 3





AFVSS. 


Recommend cysto with right ureteral stent placement this morning as the pt is 

mononephric. Risks and benefits of the procedure discussed with the pt. All 

questions answered. Pt agrees to the procedure at this time. Consent obtained. 


Pre-op chest x-ray and EKG obtained. 


Will provide pre-op Cipro. 


Will plan for definitive stone management as an outpatient with ESWL vs URS/LL. 


Thanks for the consult. Will continue to follow along with primary service.

## 2017-08-31 NOTE — ANESTHESIOLOGY PROGRESS NOTE
Anesthesia Post Op Note


Date & Time


Aug 31, 2017 at 11:22





Vital Signs


Pain Intensity:  0





Vital Signs Past 12 Hours








  Date Time  Temp Pulse Resp B/P (MAP) Pulse Ox O2 Delivery O2 Flow Rate FiO2


 


8/31/17 10:38 37.0 74 16 136/83 (100) 94 Room Air  


 


8/31/17 10:15  60 16 158/76 91 Room Air  


 


8/31/17 10:05 36.0 56 16 123/75 98 Mask 10 


 


8/31/17 09:58 36.0 62 16 145/78 98 Mask 10 


 


8/31/17 07:56 36.8 73 18 143/85 (104) 93 Room Air  


 


8/31/17 07:45      Room Air  


 


8/31/17 00:27 36.6 57 18 119/70 (86) 96 Room Air  


 


8/31/17 00:00     94 Room Air  











Notes


Mental Status:  alert / awake / arousable, participated in evaluation


Pt Amnestic to Procedure:  Yes


Nausea / Vomiting:  adequately controlled


Pain:  adequately controlled


Airway Patency, RR, SpO2:  stable & adequate


BP & HR:  stable & adequate


Hydration State:  stable & adequate


Anesthetic Complications:  no major complications apparent

## 2017-08-31 NOTE — DISCHARGE INSTRUCTIONS
Discharge Instructions


Date of Service


Aug 31, 2017.





Admission


Reason for Admission:  Right Flank Pain





Discharge


Discharge Diagnosis / Problem:  R ureteral stones s/p stent in solitary kidney





Discharge Goals


Goal(s):  Improve function, Improve disease control, Therapeutic intervention





Activity Recommendations


Activity Limitations:  as noted below


Lifting Limitations:  no more than 25 pounds, gradually increase as tolerated


Exercise/Sports Limitations:  rest today, gradually increase as tolerated


May Resume Sexual Activity:  when tolerated


Shower/Bathe:  no limitations


Driving or Machine Use:  resume 1 day after discharge





.





Instructions / Follow-Up


Instructions / Follow-Up


Office will call to arrange for f/u - call if haven't been contacted by 

tomorrow morning





Discharge Diet


Recommended Diet:  Regular Diet (good fluid intake)





Procedures


Procedures Performed:  


Cystoscopy, Right Retrograde pyleogram, right Ureteral Stent insertion





Pending Studies


Studies pending at discharge:  no





Medical Emergencies








.


Who to Call and When:





Medical Emergencies:  If at any time you feel your situation is an emergency, 

please call 911 immediately.





.





Non-Emergent Contact


Non-Emergency issues call your:  Urologist


Call Non-Emergent contact if:  you have a fever, temperature is above 101, your 

pain is not controlled, your pain is worsening, your pain is unusual for you, 

your pain is concerning you, you have any medication questions





.


.








"Provider Documentation" section prepared by Shahzad Phillips.








.





VTE Core Measure


Inpt VTE Proph given/why not?:  SCD's





[place] Drug Monitoring Program


Search Results:  patient reviewed within database, no issues identified

## 2017-09-05 ENCOUNTER — HOSPITAL ENCOUNTER (OUTPATIENT)
Dept: HOSPITAL 45 - C.RAD | Age: 57
Discharge: HOME | End: 2017-09-05
Attending: UROLOGY
Payer: COMMERCIAL

## 2017-09-05 DIAGNOSIS — N20.0: Primary | ICD-10-CM

## 2017-09-05 NOTE — DIAGNOSTIC IMAGING REPORT
KUB



CLINICAL HISTORY: Nephrolithiasis.



FINDINGS: Two AP supine abdominal radiographs are compared to study dated

8/31/2017 and correlated with abdominal CT dated 8/30/2017. A right ureteral

stent is new from previous. No calcifications are seen along the course of the

stent. There are at least three calculi projecting over the lower pole of the

right kidney measuring up to 7 mm. The left kidney is surgically absent. Suture

material and surgical clips are seen in the left upper quadrant. Vascular

calcifications are seen in the left lower quadrant in the pelvis. There is a

nonobstructed abdominal bowel gas pattern. The skeletal structures appear

osteopenic. The bony pelvis appears intact.



IMPRESSION:



1. A right ureteral stent is new from previous. No calcifications are seen along

the course of the stent.



2. Nonobstructing right renal calculi as above.











Dictated:  9/5/2017 2:02 PM

Transcribed:  9/5/2017 2:06 PM

Gab







Electronically signed by:  Raymond Castro M.D.

9/5/2017 2:10 PM



Dictated Date/Time:  9/5/2017 2:02 PM

## 2017-09-14 ENCOUNTER — HOSPITAL ENCOUNTER (OUTPATIENT)
Dept: HOSPITAL 45 - C.ACU | Age: 57
Discharge: HOME | End: 2017-09-14
Attending: UROLOGY
Payer: COMMERCIAL

## 2017-09-14 VITALS
SYSTOLIC BLOOD PRESSURE: 128 MMHG | DIASTOLIC BLOOD PRESSURE: 61 MMHG | HEART RATE: 102 BPM | OXYGEN SATURATION: 95 % | TEMPERATURE: 98.6 F

## 2017-09-14 VITALS
OXYGEN SATURATION: 95 % | DIASTOLIC BLOOD PRESSURE: 88 MMHG | SYSTOLIC BLOOD PRESSURE: 150 MMHG | HEART RATE: 82 BPM | TEMPERATURE: 98.42 F

## 2017-09-14 VITALS
WEIGHT: 207.43 LBS | HEIGHT: 60.98 IN | WEIGHT: 207.43 LBS | HEIGHT: 60.98 IN | BODY MASS INDEX: 39.16 KG/M2 | BODY MASS INDEX: 39.16 KG/M2 | BODY MASS INDEX: 39.16 KG/M2

## 2017-09-14 VITALS
TEMPERATURE: 98.96 F | OXYGEN SATURATION: 93 % | HEART RATE: 104 BPM | SYSTOLIC BLOOD PRESSURE: 139 MMHG | DIASTOLIC BLOOD PRESSURE: 69 MMHG

## 2017-09-14 VITALS — DIASTOLIC BLOOD PRESSURE: 65 MMHG | SYSTOLIC BLOOD PRESSURE: 142 MMHG | HEART RATE: 108 BPM | OXYGEN SATURATION: 93 %

## 2017-09-14 DIAGNOSIS — N18.3: ICD-10-CM

## 2017-09-14 DIAGNOSIS — Q60.0: ICD-10-CM

## 2017-09-14 DIAGNOSIS — Z79.84: ICD-10-CM

## 2017-09-14 DIAGNOSIS — E53.8: ICD-10-CM

## 2017-09-14 DIAGNOSIS — Z79.899: ICD-10-CM

## 2017-09-14 DIAGNOSIS — N20.2: Primary | ICD-10-CM

## 2017-09-14 DIAGNOSIS — I12.9: ICD-10-CM

## 2017-09-14 RX ADMIN — FENTANYL CITRATE PRN MCG: 50 INJECTION, SOLUTION INTRAMUSCULAR; INTRAVENOUS at 15:51

## 2017-09-14 RX ADMIN — FENTANYL CITRATE PRN MCG: 50 INJECTION, SOLUTION INTRAMUSCULAR; INTRAVENOUS at 15:46

## 2017-09-14 RX ADMIN — FENTANYL CITRATE PRN MCG: 50 INJECTION, SOLUTION INTRAMUSCULAR; INTRAVENOUS at 16:03

## 2017-09-14 RX ADMIN — FENTANYL CITRATE PRN MCG: 50 INJECTION, SOLUTION INTRAMUSCULAR; INTRAVENOUS at 15:56

## 2017-09-14 NOTE — [facility] OPERATIVE REPORT
Operative Report


Operative Date


Sep 14, 2017.





Pre-Operative Diagnosis





Right renal and ureteral stones in solitary kidney





Post-Operative Diagnosis





Right renal and ureteral stones in solitary kidney





Procedure(s) Performed





Cystoscopy, Right Retrograde Pyelogram, Right Semi-rigid and flexible 


Ureteroscopy, Basket Stone Extraction, Laser Lithotripsy, Right Stent Exchange





Surgeon


Dr. Shahzad Phillips





Assistant Surgeon(s)


NA





Estimated Blood Loss


none





Findings


No residual stones on fluoro or direct visualization, no ureteral injury, good 

stent position on fluoro





Specimens





A: Right renal and ureteral stones for chemical analysis





Drains


6 fr 26 loop stent





Anesthesia


GALMA





Disposition


Recovery Room / PACU





Indications


57-year-old  female who had undergone right acute ureteral stent 

placement for obstructing ureteral stones solitary kidney and possible 

infection who is here today to attempt to clear her right renal moiety of 

stones with ureteroscopy.  Please see H&P for further details.  Intravenous 

ciprofloxacin provided for antibiotic coverage and SCDs used for DVT 

prophylaxis.  Consent is reviewed in the chart properly with patient.





Description of Procedure


Patient was properly identified and brought to the operative suite after 

identification of appropriate consent of the chart.  General anesthesia with 

laryngeal mask was patient was prepped and draped in the standard fashion for 

this procedure.  Full timeout procedure was followed.  22 French rigid 

cystoscope was passed into the bladder direct visualization and the ureteral 

stent in the right-hand side was grasped and brought up to the level of the 

meatus.  This was cannulated using a sensor tip wire which was advanced up to 

the level of the right kidney kept until the end of the case as a safety wire.  

Open-ended 5 French Pollack catheter was advanced level of the right renal 

pelvis and the electing system was opacified for retrograde pyelogram and 

defining her anatomy.  Sensor wire was replaced and a semirigid ureteroscope 

was able to be advanced up the ureter easily without the need for dilation.  In 

the distal ureter ureteral stone consistent with the patient's previous 

findings prior to stent placement was appreciated.  This was small and after 

stent dilation was easily able to be removed with a 0 tip basket without 

resistance or injury.  Semirigid ureteroscope was advanced up to the level of 

the proximal ureter with no residual stones being appreciated.  Working sensor 

tip wire was advanced through the semirigid ureteroscope which was backloaded 

off the wire with exit ureteroscopy being performed.  A 12/14 28 cm ureteral 

access sheath was placed and a flexible ureteroscope, fiberoptic, was advanced 

up to the level of the right renal pelvis.  Complete pyeloscopy was performed 

and further opacification of the collecting system with Conray to ensure no 

missed calyces was performed as needed.  Stones were encountered within the mid 

and lower pole calyces which were fractured using a 200  laser fiber at a 

setting of 0.8 J and 12 Hz.  When stones were sufficiently small for easy 

removal an opened backed basket was used to extract the pieces.  This was 

continued until every piece of stone that had been encountered or visualized 

was were extracted.  Complete pyeloscopy was repeated with no residual stones 

or injuries noted.  Complete exit ureteroscopy including removal of the access 

sheath was performed again demonstrating no stones or ureteral injuries.  

Cystoscope was backloaded over the safety wire and a 6 French 26 cm loop stent 

was advanced with a full coil present at the level of the renal pelvis and 

redundant loops present within the bladder.  Bladder was drained and cystoscope 

was removed.  Anesthesia was reversed and patient was transferred to the 

recovery room in stable condition.





Follow-up care: Patient's provided with a short course of ciprofloxacin for 

postoperative coverage.  She reports she has sufficient analgesics at home.  

Postoperative appointment for possible stent removal is confirmed.  Patient's 

instructed to contact our service should she note any fevers, chills, nausea, 

vomiting or other significant difficulties in postoperative period.


I attest to the content of the Intraoperative Record and any orders documented 

therein.  Any exceptions are noted below.

## 2017-09-14 NOTE — ANESTHESIOLOGY PROGRESS NOTE
Anesthesia Post Op Note


Date & Time


Sep 14, 2017 at 16:13





Vital Signs


Pain Intensity:  4





Vital Signs Past 12 Hours








  Date Time  Temp Pulse Resp B/P (MAP) Pulse Ox O2 Delivery O2 Flow Rate FiO2


 


9/14/17 16:05  101 16 144/98 96 Room Air  


 


9/14/17 15:55  93 10 141/85 89 Room Air  


 


9/14/17 15:45  102 22 163/91 99 Oxymask 10 


 


9/14/17 15:37 36.0 99 13 175/96 98 Oxymask 10 


 


9/14/17 11:41 36.9 82 16 150/88 (108) 95 Room Air  











Notes


Mental Status:  alert / awake / arousable, participated in evaluation


Pt Amnestic to Procedure:  Yes


Nausea / Vomiting:  adequately controlled


Pain:  adequately controlled


Airway Patency, RR, SpO2:  stable & adequate


BP & HR:  stable & adequate


Hydration State:  stable & adequate


Anesthetic Complications:  no major complications apparent

## 2017-09-14 NOTE — DISCHARGE INSTRUCTIONS
Discharge Instructions


Date of Service


Sep 14, 2017.





Admission


Reason for Admission:  Stones





Discharge


Discharge Diagnosis / Problem:  R ureteral and renal stones in solitary kidney s

/p uscope, laser, stent





Discharge Goals


Goal(s):  Improve function, Improve disease control, Therapeutic intervention





Activity Recommendations


Activity Limitations:  as noted below


Lifting Limitations:  no more than 25 pounds, gradually increase as tolerated


Exercise/Sports Limitations:  rest today, gradually increase as tolerated


May Resume Sexual Activity:  when tolerated


Shower/Bathe:  no limitations


Driving or Machine Use:  resume 1 day after discharge





.





Instructions / Follow-Up


Instructions / Follow-Up


Follow-up in office as planned for stent removal after [facility] Xray prior





Discharge Diet


Recommended Diet:  Regular Diet (good fluid intake)





Procedures


Procedures Performed:  


Cystoscopy, Right Retrograde Pyelogram, Right Semi-rigid and flexible 


Ureteroscopy, Basket Stone Extraction, Laser Lithotripsy, Right Stent Exchange





Pending Studies


Studies pending at discharge:  yes


List of pending studies:  


Stone analysis





Medical Emergencies








.


Who to Call and When:





Medical Emergencies:  If at any time you feel your situation is an emergency, 

please call 911 immediately.





.





Non-Emergent Contact


Non-Emergency issues call your:  Urologist


Call Non-Emergent contact if:  you have a fever, temperature is above 101, your 

pain is not controlled, your pain is worsening, your pain is unusual for you, 

your pain is concerning you, you have any medication questions





.


.








"Provider Documentation" section prepared by Shahzad Phillips.








.





VTE Core Measure


Inpt VTE Proph given/why not?:  SCD's

## 2017-09-14 NOTE — DIAGNOSTIC IMAGING REPORT
RETROGRADE INCLUDES [facility]



CLINICAL HISTORY: RT CYSTO/LASER/STENT EXCHANGE    



TECHNIQUE: Image intensifier



COMPARISON STUDY:  None



FINDINGS: Image intensifier was used for right ureteral stent

replacement/exchange



IMPRESSION:  Image intensifier utilized for right ureteral stent

replacement/exchange 









The above report was generated using voice recognition software.  It may contain

grammatical, syntax or spelling errors.







Electronically signed by:  Pablo Saravia M.D.

9/14/2017 3:40 PM



Dictated Date/Time:  9/14/2017 3:40 PM

## 2017-09-14 NOTE — [facility] POST OPERATIVE BRIEF NOTE
Immediate Operative Summary


Operative Date


Sep 14, 2017.





Pre-Operative Diagnosis





Right renal and ureteral stones in solitary kidney





Post-Operative Diagnosis





Right renal and ureteral stones in solitary kidney





Procedure(s) Performed





Cystoscopy, Right Retrograde Pyelogram, Right Semi-rigid and flexible 


Ureteroscopy, Basket Stone Extraction, Laser Lithotripsy, Right Stent Exchange





Surgeon


Dr. Shahzad Phillips





Assistant Surgeon(s)


NA





Estimated Blood Loss


none





Findings


No residual stones on fluoro or direct visualization, no ureteral injury, good 

stent position on fluoro





Specimens





A: Right renal and ureteral stones for chemical analysis





Drains


6 fr 26 loop stent





Anesthesia


GALMA





Complication(s)


None





Disposition


Recovery Room / PACU

## 2017-09-27 ENCOUNTER — HOSPITAL ENCOUNTER (OUTPATIENT)
Dept: HOSPITAL 45 - C.RAD | Age: 57
Discharge: HOME | End: 2017-09-27
Attending: UROLOGY
Payer: COMMERCIAL

## 2017-09-27 DIAGNOSIS — N20.0: Primary | ICD-10-CM

## 2017-09-27 NOTE — DIAGNOSTIC IMAGING REPORT
[facility]



HISTORY: Right-sided stone.  N20.0 Nephrolithiasis



COMPARISON: [facility] 9/5/2017.



FINDINGS: The bowel gas pattern is unremarkable. There are no dilated loops of

small bowel to suggest an obstruction.  The right ureteral stent is in good

position. Pelvic calcifications remain stable and are consistent with

phleboliths. The right renal calculi seen on the prior studies are obscured by

overlying bowel gas. Suture material within the left upper quadrant. No left

renal or left ureteral calculi. No pneumoperitoneum or pneumatosis.



IMPRESSION:  



1. The right ureteral stent is in good position.

2. No ureteral calculi identified.

3. The right renal calculi seen on the prior studies are obscured by overlying

bowel gas.







Electronically signed by:  Blake Uribe M.D.

9/27/2017 3:05 PM



Dictated Date/Time:  9/27/2017 3:03 PM

## 2017-10-23 ENCOUNTER — HOSPITAL ENCOUNTER (OUTPATIENT)
Dept: HOSPITAL 45 - C.CTS | Age: 57
Discharge: HOME | End: 2017-10-23
Attending: UROLOGY
Payer: COMMERCIAL

## 2017-10-23 DIAGNOSIS — N20.0: Primary | ICD-10-CM

## 2017-10-23 DIAGNOSIS — Z90.5: ICD-10-CM

## 2017-10-23 NOTE — DIAGNOSTIC IMAGING REPORT
ABD/PELVIS NO IV OR ORAL CONT



CT DOSE: 1050.66 mGycm



HISTORY: Nephrocalcinosis  N20.0 Nephrolithiasis



TECHNIQUE: Multiaxial CT images of the abdomen and pelvis were performed without

contrast.  A dose lowering technique was utilized adhering to the principles of

ALARA.





COMPARISON STUDY: 8/30/2017



FINDINGS: Lung bases are clear. Liver spleen and pancreas are unremarkable.

Findings of a prior left refractory.



Calcifications the right kidney are diminished in size as well as number. There

is a small residual 2 mm faint calcification posterior aspect right lower pole.

Hydronephrosis previously described has resolved.



Ureters normal in course and caliber. Bladder is midline. Bowel pattern overall

is nonobstructive. There are several small scattered colonic diverticuli with no

evidence of diverticulitis.



IMPRESSION: 



1. Improved exam from the prior study.





2. Considerable decrease in size as well as number of right ureteral /renal

calcifications

3. Residual 2 mm nonobstructing calcification lower pole right kidney.





4. Interval passage of the right ureteral calcifications previously described no

current evidence for hydronephrosis.

5. Status post left nephrectomy









The above report was generated using voice recognition software.  It may contain

grammatical, syntax or spelling errors.







Electronically signed by:  Pablo Saravia M.D.

10/23/2017 8:34 AM



Dictated Date/Time:  10/23/2017 8:30 AM

## 2018-07-25 ENCOUNTER — HOSPITAL ENCOUNTER (OUTPATIENT)
Dept: HOSPITAL 45 - C.RAD | Age: 58
Discharge: HOME | End: 2018-07-25
Attending: UROLOGY
Payer: COMMERCIAL

## 2018-07-25 DIAGNOSIS — N20.0: Primary | ICD-10-CM

## 2018-07-25 NOTE — DIAGNOSTIC IMAGING REPORT
[facility]



HISTORY: Acute right-sided flank pain with history of nephrolithiasis  N20.0

[id number]



COMPARISON: [facility] 9/27/2017, CT 10/23/2017.



FINDINGS: Status post removal of right-sided ureteral stent. No definite renal

or ureteral calculi identified. Calcifications of the pelvis suggest

phleboliths. Surgical suture material projects over the abdominal left upper

quadrant. Bowel gas pattern is nonobstructive. Degenerative changes of the spine

and hips.



IMPRESSION:  

No renal or ureteral calculi identified.







Electronically signed by:  Renny Welch M.D.

7/25/2018 1:49 PM



Dictated Date/Time:  7/25/2018 1:40 PM